# Patient Record
Sex: MALE | URBAN - METROPOLITAN AREA
[De-identification: names, ages, dates, MRNs, and addresses within clinical notes are randomized per-mention and may not be internally consistent; named-entity substitution may affect disease eponyms.]

---

## 2020-10-22 ENCOUNTER — POST-OPERATIVE VISIT (OUTPATIENT)
Dept: URBAN - METROPOLITAN AREA CLINIC 7 | Facility: CLINIC | Age: 50
End: 2020-10-22
Payer: COMMERCIAL

## 2020-10-22 PROCEDURE — 92134 CPTRZ OPH DX IMG PST SGM RTA: CPT | Performed by: OPHTHALMOLOGY

## 2020-10-22 PROCEDURE — 92012 INTRM OPH EXAM EST PATIENT: CPT | Performed by: OPHTHALMOLOGY

## 2020-10-22 PROCEDURE — 99024 POSTOP FOLLOW-UP VISIT: CPT | Performed by: OPHTHALMOLOGY

## 2020-10-22 ASSESSMENT — INTRAOCULAR PRESSURE
OS: 14
OD: 14

## 2020-10-22 NOTE — IMPRESSION/PLAN
Impression: S/P PPV, MPO, PRP X PDR/TRD OD - 64 Days. Plan: Exam/OCT show quiet PDR OU, ? CSDME vs ERM OD, no CSDME OS. Monitor. 

6 weeks, colors/FA sweeps/OCT OU, reeval

## 2020-12-03 ENCOUNTER — OFFICE VISIT (OUTPATIENT)
Dept: URBAN - METROPOLITAN AREA CLINIC 7 | Facility: CLINIC | Age: 50
End: 2020-12-03
Payer: MEDICARE

## 2020-12-03 DIAGNOSIS — H35.373 PUCKERING OF MACULA, BILATERAL: ICD-10-CM

## 2020-12-03 DIAGNOSIS — E11.3513 TYPE 2 DIABETES MELLITUS WITH PROLIFERATIVE DIABETIC RETINOPATHY WITH MACULAR EDEMA, BILATERAL: ICD-10-CM

## 2020-12-03 DIAGNOSIS — H25.13 AGE-RELATED NUCLEAR CATARACT, BILATERAL: ICD-10-CM

## 2020-12-03 PROCEDURE — 92134 CPTRZ OPH DX IMG PST SGM RTA: CPT | Performed by: OPHTHALMOLOGY

## 2020-12-03 PROCEDURE — 92014 COMPRE OPH EXAM EST PT 1/>: CPT | Performed by: OPHTHALMOLOGY

## 2020-12-03 PROCEDURE — 92250 FUNDUS PHOTOGRAPHY W/I&R: CPT | Performed by: OPHTHALMOLOGY

## 2020-12-03 PROCEDURE — 92235 FLUORESCEIN ANGRPH MLTIFRAME: CPT | Performed by: OPHTHALMOLOGY

## 2020-12-03 ASSESSMENT — INTRAOCULAR PRESSURE
OS: 14
OD: 11

## 2020-12-03 NOTE — IMPRESSION/PLAN
Impression: Type 2 diabetes mellitus with proliferative diabetic retinopathy with macular edema, bilateral: G2.6039.  Plan: as above

## 2020-12-03 NOTE — IMPRESSION/PLAN
Impression: PDR/TRD OU
s/p PPV/MP/PRP OS 2/7/20
s/p PPV/MP/PRP OD8/20/20 Plan: Exam/photos/OCT show quiet PDR with trace ERM formation OU and stable cystic changes OD. FA sweeps 12/3/20 show irregular LOREN, angiographic leakage, and persistent ischemia of temporal macula OU. Recommend light fill-in PRP of temporal macula OD/OS. 1-2 weeks, fill-in PRP OD; then 1-2 weeks, fill-in PRP OS (use today's FA)

## 2020-12-03 NOTE — IMPRESSION/PLAN
Impression: Puckering of macula, bilateral: H35.373. Plan: Exam/OCT show mild ERM formation, observe.

## 2021-02-03 ENCOUNTER — OFFICE VISIT (OUTPATIENT)
Dept: URBAN - METROPOLITAN AREA CLINIC 7 | Facility: CLINIC | Age: 51
End: 2021-02-03
Payer: MEDICARE

## 2021-02-03 PROCEDURE — 67228 TREATMENT X10SV RETINOPATHY: CPT | Performed by: OPHTHALMOLOGY

## 2021-02-03 ASSESSMENT — INTRAOCULAR PRESSURE
OS: 13
OD: 17

## 2021-02-17 ENCOUNTER — PROCEDURE (OUTPATIENT)
Dept: URBAN - METROPOLITAN AREA CLINIC 7 | Facility: CLINIC | Age: 51
End: 2021-02-17
Payer: MEDICARE

## 2021-02-17 PROCEDURE — 67228 TREATMENT X10SV RETINOPATHY: CPT | Performed by: OPHTHALMOLOGY

## 2021-02-17 ASSESSMENT — INTRAOCULAR PRESSURE
OD: 10
OS: 10

## 2021-03-31 ENCOUNTER — OFFICE VISIT (OUTPATIENT)
Dept: URBAN - METROPOLITAN AREA CLINIC 7 | Facility: CLINIC | Age: 51
End: 2021-03-31
Payer: MEDICARE

## 2021-03-31 PROCEDURE — 92134 CPTRZ OPH DX IMG PST SGM RTA: CPT | Performed by: OPHTHALMOLOGY

## 2021-03-31 PROCEDURE — 99213 OFFICE O/P EST LOW 20 MIN: CPT | Performed by: OPHTHALMOLOGY

## 2021-03-31 ASSESSMENT — INTRAOCULAR PRESSURE
OS: 16
OD: 13

## 2021-04-16 ENCOUNTER — OFFICE VISIT (OUTPATIENT)
Dept: URBAN - METROPOLITAN AREA CLINIC 13 | Facility: CLINIC | Age: 51
End: 2021-04-16
Payer: MEDICARE

## 2021-04-16 PROCEDURE — 92134 CPTRZ OPH DX IMG PST SGM RTA: CPT | Performed by: OPHTHALMOLOGY

## 2021-04-16 PROCEDURE — 67028 INJECTION EYE DRUG: CPT | Performed by: OPHTHALMOLOGY

## 2021-04-16 PROCEDURE — 99214 OFFICE O/P EST MOD 30 MIN: CPT | Performed by: OPHTHALMOLOGY

## 2021-04-16 ASSESSMENT — INTRAOCULAR PRESSURE
OS: 8
OD: 10

## 2021-04-16 NOTE — IMPRESSION/PLAN
Impression: VH OS Plan: Pt presents emergently c/o new floaters OS x several days. Exam is c/w VH w/o RD. Discussed Dx and NHx with patient. Reviewed options of obs, Lucentis, and PPV; proceed with Lucentis OS today. Add fill-in PRP once view is clear; PPV if VH worsens. 

1 month, OCT OU, reeval

## 2021-04-16 NOTE — IMPRESSION/PLAN
Impression: Puckering of macula, bilateral: H35.373. Plan: Exam/OCT show mild ERM formation, observe. OK for new MRx.

## 2021-04-16 NOTE — IMPRESSION/PLAN
Impression: PDR/TRD OU
s/p PPV/MP/PRP OS 2/7/20
s/p PPV/MP/PRP OD8/20/20 Plan: Exam/OCT show PDR with ERM formation OU and stable cystic changes OD. FA sweeps 12/3/20 showed irregular LOREN, angiographic leakage, and persistent ischemia of temporal macula OU.

## 2021-05-19 ENCOUNTER — OFFICE VISIT (OUTPATIENT)
Dept: URBAN - METROPOLITAN AREA CLINIC 7 | Facility: CLINIC | Age: 51
End: 2021-05-19
Payer: MEDICARE

## 2021-05-19 PROCEDURE — 92012 INTRM OPH EXAM EST PATIENT: CPT | Performed by: OPHTHALMOLOGY

## 2021-05-19 PROCEDURE — 92134 CPTRZ OPH DX IMG PST SGM RTA: CPT | Performed by: OPHTHALMOLOGY

## 2021-05-19 ASSESSMENT — INTRAOCULAR PRESSURE
OS: 12
OD: 14

## 2021-05-19 NOTE — IMPRESSION/PLAN
Impression: VH OS Plan: Nearly-resolved VH s/p Lucentis 4/16/21. Reviewed options of obs, Lucentis, and PPV; view is clear to add laser. Will schedule for fill-in PRP. 1-2 weeks, fill-in PRP OS

## 2021-06-02 ENCOUNTER — PROCEDURE (OUTPATIENT)
Dept: URBAN - METROPOLITAN AREA CLINIC 7 | Facility: CLINIC | Age: 51
End: 2021-06-02
Payer: MEDICARE

## 2021-06-02 PROCEDURE — 67228 TREATMENT X10SV RETINOPATHY: CPT | Performed by: OPHTHALMOLOGY

## 2021-06-02 ASSESSMENT — INTRAOCULAR PRESSURE
OD: 13
OS: 11

## 2021-06-30 ENCOUNTER — OFFICE VISIT (OUTPATIENT)
Dept: URBAN - METROPOLITAN AREA CLINIC 7 | Facility: CLINIC | Age: 51
End: 2021-06-30
Payer: MEDICARE

## 2021-06-30 PROCEDURE — 92012 INTRM OPH EXAM EST PATIENT: CPT | Performed by: OPHTHALMOLOGY

## 2021-06-30 PROCEDURE — 92134 CPTRZ OPH DX IMG PST SGM RTA: CPT | Performed by: OPHTHALMOLOGY

## 2021-06-30 ASSESSMENT — INTRAOCULAR PRESSURE
OD: 18
OS: 14

## 2021-06-30 NOTE — IMPRESSION/PLAN
Impression: PDR/TRD OU
s/p PPV/MP/PRP OS 2/7/20
s/p PPV/MP/PRP OD 8/20/20 Plan: Exam/OCT show PDR with ERM formation OU and stable cystic changes OD. FA sweeps 12/3/20 showed irregular LOREN, angiographic leakage, and persistent ischemia of temporal macula OU. Monitor.

## 2021-06-30 NOTE — IMPRESSION/PLAN
Impression: VH OS Plan: Resolved VH s/p Lucentis 4/16/21 and fill-in PRP 6/2/21. Monitor 1 month, OCT OU, reeval

## 2021-06-30 NOTE — IMPRESSION/PLAN
Impression: Puckering of macula, bilateral: H35.373. Plan: Exam/OCT show ERM formation OU. Reviewed options of obs vs PPV/MP, rec observation. OK for new MRx.

## 2021-08-11 ENCOUNTER — OFFICE VISIT (OUTPATIENT)
Dept: URBAN - METROPOLITAN AREA CLINIC 7 | Facility: CLINIC | Age: 51
End: 2021-08-11
Payer: MEDICARE

## 2021-08-11 DIAGNOSIS — H43.12 VITREOUS HEMORRHAGE, LEFT EYE: Primary | ICD-10-CM

## 2021-08-11 PROCEDURE — 92012 INTRM OPH EXAM EST PATIENT: CPT | Performed by: OPHTHALMOLOGY

## 2021-08-11 PROCEDURE — 92134 CPTRZ OPH DX IMG PST SGM RTA: CPT | Performed by: OPHTHALMOLOGY

## 2021-08-11 ASSESSMENT — INTRAOCULAR PRESSURE
OD: 17
OS: 17

## 2021-08-11 NOTE — IMPRESSION/PLAN
Impression: ERM OU Plan: Exam/OCT show ERM formation OU. Reviewed options of obs vs PPV/MP, rec observation. OK for new MRx.

## 2021-09-08 ENCOUNTER — OFFICE VISIT (OUTPATIENT)
Dept: URBAN - METROPOLITAN AREA CLINIC 7 | Facility: CLINIC | Age: 51
End: 2021-09-08
Payer: MEDICARE

## 2021-09-08 DIAGNOSIS — E11.3533 TYPE 2 DIABETES MELLITUS WITH PROLIFERATIVE DIABETIC RETINOPATHY WITH TRACTION RETINAL DETACHMENT NOT INVOLVING THE MACULA, BILATERAL: ICD-10-CM

## 2021-09-08 PROCEDURE — 67028 INJECTION EYE DRUG: CPT | Performed by: OPHTHALMOLOGY

## 2021-09-08 PROCEDURE — 92134 CPTRZ OPH DX IMG PST SGM RTA: CPT | Performed by: OPHTHALMOLOGY

## 2021-09-08 PROCEDURE — 92012 INTRM OPH EXAM EST PATIENT: CPT | Performed by: OPHTHALMOLOGY

## 2021-09-08 ASSESSMENT — INTRAOCULAR PRESSURE
OS: 15
OD: 15

## 2021-09-08 NOTE — IMPRESSION/PLAN
Impression: VH OS Plan: Pt c/o recurrent floaters x several days. Very mild VH on exam, last Avastin 4/16/21.  Discussed options of obs vs Thaddeus, pt elects Lenice Maze. 

1 month, OCT OU, reeval

## 2021-09-08 NOTE — IMPRESSION/PLAN
Impression: ERM OU Plan: Exam/OCT show ERM formation OU. Reviewed options of obs vs PPV/MP, rec observation.

## 2021-10-06 ENCOUNTER — OFFICE VISIT (OUTPATIENT)
Dept: URBAN - METROPOLITAN AREA CLINIC 7 | Facility: CLINIC | Age: 51
End: 2021-10-06
Payer: MEDICARE

## 2021-10-06 PROCEDURE — 99214 OFFICE O/P EST MOD 30 MIN: CPT | Performed by: OPHTHALMOLOGY

## 2021-10-06 PROCEDURE — 92134 CPTRZ OPH DX IMG PST SGM RTA: CPT | Performed by: OPHTHALMOLOGY

## 2021-10-06 ASSESSMENT — INTRAOCULAR PRESSURE
OD: 16
OS: 16

## 2021-10-06 NOTE — IMPRESSION/PLAN
Impression: PDR with macular edema OU Plan: Exam/OCT show quiet PDR with equivocal CSDME OD, no recurrent CSDME OS. Discussed options of obs vs Lucentis, pt elects obs today. 

4-6 weeks, OCT OU, reeval

## 2021-11-17 ENCOUNTER — OFFICE VISIT (OUTPATIENT)
Dept: URBAN - METROPOLITAN AREA CLINIC 7 | Facility: CLINIC | Age: 51
End: 2021-11-17
Payer: MEDICARE

## 2021-11-17 DIAGNOSIS — H33.8 OTHER RETINAL DETACHMENTS: ICD-10-CM

## 2021-11-17 PROCEDURE — 92012 INTRM OPH EXAM EST PATIENT: CPT | Performed by: OPHTHALMOLOGY

## 2021-11-17 PROCEDURE — 92134 CPTRZ OPH DX IMG PST SGM RTA: CPT | Performed by: OPHTHALMOLOGY

## 2021-11-17 ASSESSMENT — INTRAOCULAR PRESSURE
OD: 14
OS: 10

## 2021-11-17 NOTE — IMPRESSION/PLAN
Impression: PDR with macular edema OU Plan: Exam/OCT show quiet PDR with equivocal CSDME OD, no recurrent CSDME OS. VA OD below baseline, but pt denies any changes. Discussed options of obs vs Lucentis, pt elects obs today. 

4 weeks, photos/FA sweeps/OCT OD>OS, reeval

## 2021-12-15 ENCOUNTER — OFFICE VISIT (OUTPATIENT)
Dept: URBAN - METROPOLITAN AREA CLINIC 7 | Facility: CLINIC | Age: 51
End: 2021-12-15
Payer: MEDICARE

## 2021-12-15 DIAGNOSIS — H35.372 PUCKERING OF MACULA, LEFT EYE: ICD-10-CM

## 2021-12-15 PROCEDURE — 92134 CPTRZ OPH DX IMG PST SGM RTA: CPT | Performed by: OPHTHALMOLOGY

## 2021-12-15 PROCEDURE — 92014 COMPRE OPH EXAM EST PT 1/>: CPT | Performed by: OPHTHALMOLOGY

## 2021-12-15 ASSESSMENT — INTRAOCULAR PRESSURE
OD: 13
OS: 11

## 2021-12-15 NOTE — IMPRESSION/PLAN
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
s/p PPV/MP/PRP OS 2/7/20
s/p PPV/MP/PRP OD 8/20/20 Plan: Exam/OCT show PDR with ERM formation OU and stable cystic changes OD. FA sweeps 12/3/20 showed irregular LOREN, angiographic leakage, and persistent ischemia of temporal macula OU. Monitor.

## 2021-12-15 NOTE — IMPRESSION/PLAN
Impression: Vitreous hemorrhage, left eye: H43.12. Plan: Exam confirms new vitreous hemorrhage in the left eye, secondary to PDR. Discussed R/B/A's of observation vs. PPV vs. Anti-VEGF injection. Recommend AntiVEGF injection today. Patient to sleep with elevation. May consider PPV if does not resolve on its own.

## 2021-12-15 NOTE — IMPRESSION/PLAN
Impression: Puckering of macula, left eye: H35.372. Plan: Exam and prior OCT demonstrate a Macular Pucker. The diagnosis, natural history, and prognosis of ERMs, as well as the risks and benefits of PPV/MP versus observation were discussed at length. Given the patient's current visual acuity and minimal hindrance on activities of daily living, observation was recommended at this time.

## 2021-12-15 NOTE — IMPRESSION/PLAN
Impression: Type 2 diab with prolif diab rtnop with macular edema, bi Z29.4982 Plan: Exam/OCT show new VH OS with quiet PDR with equivocal CSDME OD. VA OD below baseline, but pt denies any changes. Discussed options of obs vs Lucentis, pt elects Avastin injection today. R/B/A discussed and patient elects to proceed. Intravitreal Avastin injected OU today without complication.  

4 weeks, OCT OU, reeval , poss Avastin

## 2022-01-13 ENCOUNTER — OFFICE VISIT (OUTPATIENT)
Dept: URBAN - METROPOLITAN AREA CLINIC 7 | Facility: CLINIC | Age: 52
End: 2022-01-13
Payer: MEDICARE

## 2022-01-13 PROCEDURE — 92012 INTRM OPH EXAM EST PATIENT: CPT | Performed by: OPHTHALMOLOGY

## 2022-01-13 PROCEDURE — 92134 CPTRZ OPH DX IMG PST SGM RTA: CPT | Performed by: OPHTHALMOLOGY

## 2022-01-13 ASSESSMENT — INTRAOCULAR PRESSURE
OD: 19
OS: 16

## 2022-01-13 NOTE — IMPRESSION/PLAN
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
s/p PPV/MP/PRP OS 2/7/20
s/p PPV/MP/PRP OD 8/20/20 Plan: Exam/OCT show PDR with ERM formation OU and stable cystic changes OD. FA sweeps 12/3/20 showed irregular LOREN, angiographic leakage, and persistent ischemia of temporal macula OU. Monitor. Billing Type: Third-Party Bill

## 2022-02-17 ENCOUNTER — OFFICE VISIT (OUTPATIENT)
Dept: URBAN - METROPOLITAN AREA CLINIC 7 | Facility: CLINIC | Age: 52
End: 2022-02-17
Payer: MEDICARE

## 2022-02-17 PROCEDURE — 92014 COMPRE OPH EXAM EST PT 1/>: CPT | Performed by: OPHTHALMOLOGY

## 2022-02-17 PROCEDURE — 92134 CPTRZ OPH DX IMG PST SGM RTA: CPT | Performed by: OPHTHALMOLOGY

## 2022-02-17 RX ORDER — PREDNISOLONE ACETATE 10 MG/ML
1 % SUSPENSION/ DROPS OPHTHALMIC
Qty: 5 | Refills: 1 | Status: INACTIVE
Start: 2022-02-17 | End: 2022-04-17

## 2022-02-17 ASSESSMENT — INTRAOCULAR PRESSURE
OD: 18
OS: 13

## 2022-03-23 ENCOUNTER — OFFICE VISIT (OUTPATIENT)
Dept: URBAN - METROPOLITAN AREA CLINIC 7 | Facility: CLINIC | Age: 52
End: 2022-03-23
Payer: MEDICARE

## 2022-03-23 PROCEDURE — 92250 FUNDUS PHOTOGRAPHY W/I&R: CPT | Performed by: OPHTHALMOLOGY

## 2022-03-23 PROCEDURE — 99214 OFFICE O/P EST MOD 30 MIN: CPT | Performed by: OPHTHALMOLOGY

## 2022-03-23 PROCEDURE — 92134 CPTRZ OPH DX IMG PST SGM RTA: CPT | Performed by: OPHTHALMOLOGY

## 2022-03-23 PROCEDURE — 92235 FLUORESCEIN ANGRPH MLTIFRAME: CPT | Performed by: OPHTHALMOLOGY

## 2022-03-23 ASSESSMENT — INTRAOCULAR PRESSURE
OD: 20
OS: 17

## 2022-03-23 NOTE — IMPRESSION/PLAN
Impression: PDR with macular edema OU Plan: Exam/OCT show quiet PDR w/o recurrent CSDME OU. Photos 3/23/22 showed quiet PDR OU. FA sweeps 3/23/22 showed irregular LOREN, ischemia of temporal macula, no recurrent NV OU. Discussed options of obs, Lucentis, and laser; pt elects obs today. 

4-6 weeks, OCT OU, reeval

## 2022-03-23 NOTE — IMPRESSION/PLAN
Impression: Other retinal detachments  - s/p PPV/MP/PRP OS 2/7/20
 - s/p PPV/MP/PRP OD 8/20/20 Plan: Attached and stable OU

## 2022-05-04 ENCOUNTER — OFFICE VISIT (OUTPATIENT)
Dept: URBAN - METROPOLITAN AREA CLINIC 7 | Facility: CLINIC | Age: 52
End: 2022-05-04
Payer: MEDICARE

## 2022-05-04 DIAGNOSIS — H35.373 PUCKERING OF MACULA, BILATERAL: ICD-10-CM

## 2022-05-04 DIAGNOSIS — H33.8 OTHER RETINAL DETACHMENTS: ICD-10-CM

## 2022-05-04 DIAGNOSIS — E11.3513 TYPE 2 DIAB WITH PROLIF DIAB RTNOP WITH MACULAR EDEMA, BI: Primary | ICD-10-CM

## 2022-05-04 PROCEDURE — 92012 INTRM OPH EXAM EST PATIENT: CPT | Performed by: OPHTHALMOLOGY

## 2022-05-04 PROCEDURE — 92134 CPTRZ OPH DX IMG PST SGM RTA: CPT | Performed by: OPHTHALMOLOGY

## 2022-05-04 ASSESSMENT — INTRAOCULAR PRESSURE
OD: 16
OS: 13

## 2022-05-04 NOTE — IMPRESSION/PLAN
Impression: PDR with macular edema OU
 - last Avastin 12/15/21 (SI) Plan: Exam/OCT show quiet PDR w/o recurrent CSDME OU. Photos 3/23/22 showed quiet PDR OU. FA sweeps 3/23/22 showed irregular LOREN, ischemia of temporal macula, no recurrent NV OU. Discussed options of obs, Lucentis, and laser; pt elects obs today. OK to extend. 

3 months, OCT OU, reeval

## 2022-08-10 ENCOUNTER — OFFICE VISIT (OUTPATIENT)
Dept: URBAN - METROPOLITAN AREA CLINIC 7 | Facility: CLINIC | Age: 52
End: 2022-08-10
Payer: MEDICARE

## 2022-08-10 DIAGNOSIS — H35.373 PUCKERING OF MACULA, BILATERAL: ICD-10-CM

## 2022-08-10 DIAGNOSIS — H33.8 OTHER RETINAL DETACHMENTS: ICD-10-CM

## 2022-08-10 DIAGNOSIS — H25.13 AGE-RELATED NUCLEAR CATARACT, BILATERAL: ICD-10-CM

## 2022-08-10 DIAGNOSIS — E11.3513 TYPE 2 DIAB WITH PROLIF DIAB RTNOP WITH MACULAR EDEMA, BI: Primary | ICD-10-CM

## 2022-08-10 PROCEDURE — 99214 OFFICE O/P EST MOD 30 MIN: CPT | Performed by: OPHTHALMOLOGY

## 2022-08-10 PROCEDURE — 92134 CPTRZ OPH DX IMG PST SGM RTA: CPT | Performed by: OPHTHALMOLOGY

## 2022-08-10 ASSESSMENT — INTRAOCULAR PRESSURE
OS: 16
OD: 17

## 2022-08-10 NOTE — IMPRESSION/PLAN
Impression: PDR with macular edema OU
 - last Avastin 12/15/21 (SI) Plan: Exam/OCT show quiet PDR w/o recurrent CSDME OU. Photos 3/23/22 showed quiet PDR OU. FA sweeps 3/23/22 showed irregular LOREN, ischemia of temporal macula, no recurrent NV OU. Discussed options of obs, Lucentis, and laser; pt elects obs today. Last A1c 12, cont BS/BP/chol control. 

3 months, OCT OU, reeval

## 2022-11-09 ENCOUNTER — OFFICE VISIT (OUTPATIENT)
Dept: URBAN - METROPOLITAN AREA CLINIC 7 | Facility: CLINIC | Age: 52
End: 2022-11-09
Payer: MEDICARE

## 2022-11-09 DIAGNOSIS — E11.3513 TYPE 2 DIAB WITH PROLIF DIAB RTNOP WITH MACULAR EDEMA, BI: Primary | ICD-10-CM

## 2022-11-09 PROCEDURE — 92134 CPTRZ OPH DX IMG PST SGM RTA: CPT | Performed by: OPHTHALMOLOGY

## 2022-11-09 PROCEDURE — 99213 OFFICE O/P EST LOW 20 MIN: CPT | Performed by: OPHTHALMOLOGY

## 2022-11-09 ASSESSMENT — INTRAOCULAR PRESSURE
OD: 16
OS: 12

## 2022-11-09 NOTE — IMPRESSION/PLAN
Impression: PDR with macular edema OU
 - last Avastin 12/15/21 (SI) Plan: Exam/OCT show quiet PDR w/o recurrent CSDME OU. Photos 3/23/22 showed quiet PDR OU. FA sweeps 3/23/22 showed irregular LOREN, ischemia of temporal macula, no recurrent NV OU. Discussed options of obs, Lucentis, and laser; pt elects obs today. Last A1c 12, cont BS/BP/chol control. 

3 months, photos/FA sweeps/OCT OU, reeval

## 2023-02-01 ENCOUNTER — OFFICE VISIT (OUTPATIENT)
Dept: URBAN - METROPOLITAN AREA CLINIC 7 | Facility: CLINIC | Age: 53
End: 2023-02-01
Payer: MEDICARE

## 2023-02-01 DIAGNOSIS — H25.13 AGE-RELATED NUCLEAR CATARACT, BILATERAL: ICD-10-CM

## 2023-02-01 DIAGNOSIS — H33.8 OTHER RETINAL DETACHMENTS: ICD-10-CM

## 2023-02-01 DIAGNOSIS — E11.3513 TYPE 2 DIAB WITH PROLIF DIAB RTNOP WITH MACULAR EDEMA, BI: Primary | ICD-10-CM

## 2023-02-01 DIAGNOSIS — H35.373 PUCKERING OF MACULA, BILATERAL: ICD-10-CM

## 2023-02-01 PROCEDURE — 92134 CPTRZ OPH DX IMG PST SGM RTA: CPT | Performed by: OPHTHALMOLOGY

## 2023-02-01 PROCEDURE — 99214 OFFICE O/P EST MOD 30 MIN: CPT | Performed by: OPHTHALMOLOGY

## 2023-02-01 ASSESSMENT — INTRAOCULAR PRESSURE
OD: 12
OS: 13

## 2023-05-03 ENCOUNTER — OFFICE VISIT (OUTPATIENT)
Dept: URBAN - METROPOLITAN AREA CLINIC 7 | Facility: CLINIC | Age: 53
End: 2023-05-03
Payer: MEDICARE

## 2023-05-03 DIAGNOSIS — E11.3513 TYPE 2 DIAB WITH PROLIF DIAB RTNOP WITH MACULAR EDEMA, BI: Primary | ICD-10-CM

## 2023-05-03 PROCEDURE — 92250 FUNDUS PHOTOGRAPHY W/I&R: CPT | Performed by: OPHTHALMOLOGY

## 2023-05-03 PROCEDURE — 92134 CPTRZ OPH DX IMG PST SGM RTA: CPT | Performed by: OPHTHALMOLOGY

## 2023-05-03 PROCEDURE — 92235 FLUORESCEIN ANGRPH MLTIFRAME: CPT | Performed by: OPHTHALMOLOGY

## 2023-05-03 PROCEDURE — 99213 OFFICE O/P EST LOW 20 MIN: CPT | Performed by: OPHTHALMOLOGY

## 2023-05-03 ASSESSMENT — INTRAOCULAR PRESSURE
OD: 19
OS: 15

## 2023-05-03 NOTE — IMPRESSION/PLAN
Impression: PDR with macular edema OU
 - last Avastin 12/15/21 (SI) Plan: Exam/OCT show quiet PDR w/o recurrent CSDME OU. Photos 5/3/23 showed quiet PDR OU. FA sweeps 5/3/23 showed irregular LOREN, ischemia of temporal macula, no recurrent NV OU. Discussed options of obs, Lucentis, and laser; pt elects obs today. Last A1c 12, cont BS/BP/chol control. 

3 months, DFE/OCT OU, reeval

## 2023-08-18 ENCOUNTER — OFFICE VISIT (OUTPATIENT)
Dept: URBAN - METROPOLITAN AREA CLINIC 27 | Facility: CLINIC | Age: 53
End: 2023-08-18
Payer: MEDICARE

## 2023-08-18 DIAGNOSIS — H35.373 PUCKERING OF MACULA, BILATERAL: ICD-10-CM

## 2023-08-18 DIAGNOSIS — H25.13 AGE-RELATED NUCLEAR CATARACT, BILATERAL: ICD-10-CM

## 2023-08-18 DIAGNOSIS — E11.3513 TYPE 2 DIAB WITH PROLIF DIAB RTNOP WITH MACULAR EDEMA, BI: Primary | ICD-10-CM

## 2023-08-18 PROCEDURE — 99214 OFFICE O/P EST MOD 30 MIN: CPT | Performed by: OPHTHALMOLOGY

## 2023-08-18 PROCEDURE — 92134 CPTRZ OPH DX IMG PST SGM RTA: CPT | Performed by: OPHTHALMOLOGY

## 2023-08-18 ASSESSMENT — INTRAOCULAR PRESSURE
OS: 23
OD: 26

## 2024-10-27 NOTE — IMPRESSION/PLAN
Impression: PDR/TRD OU
s/p PPV/MP/PRP OS 2/7/20
s/p PPV/MP/PRP OD8/20/20 Plan: Exam/OCT show quiet PDR with trace ERM formation OU and stable cystic changes OD. FA sweeps 12/3/20 showed irregular LOREN, angiographic leakage, and persistent ischemia of temporal macula OU.   

3 months, colors/OCT OU
Impression: Puckering of macula, bilateral: H35.373. Plan: Exam/OCT show mild ERM formation, observe. OK for new MRx.
Impression: Type 2 diabetes mellitus with proliferative diabetic retinopathy with macular edema, bilateral: Z17.6987.  Plan: as above
This was a shared visit with the KAYE. I reviewed and verified the documentation.